# Patient Record
Sex: MALE | Race: WHITE | Employment: OTHER | URBAN - METROPOLITAN AREA
[De-identification: names, ages, dates, MRNs, and addresses within clinical notes are randomized per-mention and may not be internally consistent; named-entity substitution may affect disease eponyms.]

---

## 2019-08-14 ENCOUNTER — APPOINTMENT (OUTPATIENT)
Dept: GENERAL RADIOLOGY | Facility: CLINIC | Age: 74
End: 2019-08-14
Attending: FAMILY MEDICINE
Payer: MEDICARE

## 2019-08-14 ENCOUNTER — HOSPITAL ENCOUNTER (EMERGENCY)
Facility: CLINIC | Age: 74
Discharge: HOME | End: 2019-08-14
Attending: FAMILY MEDICINE | Admitting: FAMILY MEDICINE
Payer: MEDICARE

## 2019-08-14 ENCOUNTER — APPOINTMENT (OUTPATIENT)
Dept: CT IMAGING | Facility: CLINIC | Age: 74
End: 2019-08-14
Attending: FAMILY MEDICINE
Payer: MEDICARE

## 2019-08-14 VITALS
HEART RATE: 68 BPM | OXYGEN SATURATION: 97 % | RESPIRATION RATE: 20 BRPM | SYSTOLIC BLOOD PRESSURE: 170 MMHG | WEIGHT: 179 LBS | DIASTOLIC BLOOD PRESSURE: 115 MMHG | TEMPERATURE: 99.3 F

## 2019-08-14 DIAGNOSIS — S09.90XA CLOSED HEAD INJURY, INITIAL ENCOUNTER: ICD-10-CM

## 2019-08-14 DIAGNOSIS — S20.211A CHEST WALL CONTUSION, RIGHT, INITIAL ENCOUNTER: ICD-10-CM

## 2019-08-14 PROBLEM — N52.9 ED (ERECTILE DYSFUNCTION): Status: ACTIVE | Noted: 2019-08-14

## 2019-08-14 PROBLEM — I82.409 DVT (DEEP VENOUS THROMBOSIS) (H): Status: ACTIVE | Noted: 2019-08-14

## 2019-08-14 PROBLEM — E78.5 DYSLIPIDEMIA: Status: ACTIVE | Noted: 2019-08-14

## 2019-08-14 PROCEDURE — 99284 EMERGENCY DEPT VISIT MOD MDM: CPT | Mod: 25 | Performed by: FAMILY MEDICINE

## 2019-08-14 PROCEDURE — 99283 EMERGENCY DEPT VISIT LOW MDM: CPT | Mod: Z6 | Performed by: FAMILY MEDICINE

## 2019-08-14 PROCEDURE — 70450 CT HEAD/BRAIN W/O DYE: CPT

## 2019-08-14 PROCEDURE — 71046 X-RAY EXAM CHEST 2 VIEWS: CPT | Mod: TC

## 2019-08-14 NOTE — ED AVS SNAPSHOT
[facility] Emergency Department  [address] [place] [address]  Phone:  [phone]  Fax:  [phone]                                    Faheem Larsen   MRN: [id number]    Department:  [facility] Emergency Department   Date of Visit:  8/14/2019           After Visit Summary Signature Page    I have received my discharge instructions, and my questions have been answered. I have discussed any challenges I see with this plan with the nurse or doctor.    ..........................................................................................................................................  Patient/Patient Representative Signature      ..........................................................................................................................................  Patient Representative Print Name and Relationship to Patient    ..................................................               ................................................  Date                                   Time    ..........................................................................................................................................  Reviewed by Signature/Title    ...................................................              ..............................................  Date                                               Time          22EPIC Rev 08/18

## 2019-08-15 NOTE — ED NOTES
Patient interactive, jokes.  Daughter states patient has better / worse days.  States today he is more confused.

## 2019-08-15 NOTE — ED PROVIDER NOTES
[facility] ED Provider Note   Patient: Faheem Larsen  MRN #:  [id number]  Date of Visit: August 14, 2019    CC:     Chief Complaint   Patient presents with     Fall     HPI:  Faheem Larsen is a 74 year old male who presented to the emergency department after a fall. The patient was walking out of a store this afternoon at 1530 when he thought he was going to fall and began walking quickly and eventually lost his balance and fell. He fell on his right side and injured the right side of his head, right shoulder, right side of the ribcage, and right knee. The patient did not lose consciousness. The patient needed assistance standing up and had difficulty walking. The patient was tearful after falling. He was holding the right side of his face following the fall. The patient states that he is having a pinching pain on the right side of his chest while breathing. The patient denies any current headache, neck pain, abdominal pain or nausea. The patient has had episodes of vertigo in the past. The patient has occasional difficulty with walking. He will have difficulty lifting his legs and drags his feet. The patient has days of walking well and days of walking poorly. The patient's family states that the patient was particularly confused and unsteady today before the fall. The family thinks the additional confusion and unsteadiness may have been caused by the long periods of travel in the car the patient had today.  The patient is currently taking warfarin. His last INR was checked a couple days ago and was 2.6. The patient has a history of cardiac surgeries. The patient has had vertebral compression fractures.    Problem List:  Patient Active Problem List    Diagnosis Date Noted     DVT (deep venous thrombosis) (H) 08/14/2019     Priority: Medium     7/2011       Dyslipidemia 08/14/2019     Priority: Medium     ED (erectile dysfunction) 08/14/2019      Priority: Medium     Chatterjee's esophagus 04/26/2016     Priority: Medium     IMO Update 10/11       Depressive disorder 04/14/2016     Priority: Medium     Long standing  Long standing       Gastroenteritis 04/14/2016     Priority: Medium     Hyperlipidemia 04/14/2016     Priority: Medium     IMO Update 10/11  IMO Update 10/11       A-fib (H) 04/12/2016     Priority: Medium     Impaired fasting glucose 03/25/2013     Priority: Medium     Sleep apnea, organic 09/24/2012     Priority: Medium     Coronary arteriosclerosis in native artery 06/12/2011     Priority: Medium     CABG 2001, nonSTEMI 6/1/11 with rca stent  IMO Update 10/11  CABG 2001, nonSTEMI 6/1/11 with rca stent  IMO Update 10/11       Pulmonary embolism (H) 06/12/2011     Priority: Medium     DVT about 2008  Recurrent 6/2011       GERD (gastroesophageal reflux disease) 06/02/2011     Priority: Medium     HTN (hypertension) 06/02/2011     Priority: Medium     IMO Update 10/11       MI (myocardial infarction) (H) 06/02/2011     Priority: Medium     IMO Update 10/11  IMO Update 10/11    2001 and 1011         Past Medical History:   Diagnosis Date     Alzheimer's dementia        MEDS:   No current outpatient medications on file.    ALLERGIES:    Allergies   Allergen Reactions     Amoxicillin      Donepezil Hives     Dizziness, falling  Dizziness, falling         History reviewed. No pertinent surgical history.    Social History     Tobacco Use     Smoking status: Former Smoker     Smokeless tobacco: Never Used   Substance Use Topics     Alcohol use: Not Currently     Drug use: Never         Review of Systems   Except as noted in HPI, all other systems were reviewed and are negative    Physical Exam     Vitals were reviewed  Patient Vitals for the past 8 hrs:   BP Temp Temp src Pulse Resp SpO2 Weight   08/14/19 1948 (!) 170/115 99.3  F (37.4  C) Oral 68 20 97 % 81.2 kg (179 lb)     GENERAL APPEARANCE: Alert, no apparent distress.  GCS 15  HEAD:  Atraumatic  FACE: normal facies  EYES: Pupils are equal and reactive to light  HENT: normal external exam; TMs are intact and appear normal; no TMJ crepitus no evidence for malocclusion  NECK: no adenopathy or asymmetry: No midline posterior cervical spine tenderness  CHEST: Slight bruise in the right anterior chest over the pectoralis muscle; no significant tenderness or crepitus  RESP: normal respiratory effort; clear breath sounds bilaterally  CV: regular rate and rhythm; no significant murmurs, gallops or rubs  ABD: soft, no tenderness; no rebound or guarding; bowel sounds are normal  EXT: No calf tenderness or pitting edema  SKIN: no worrisome rash  NEURO: no facial droop; no focal deficits, speech is normal        Available Lab/Imaging Results     Results for orders placed or performed during the hospital encounter of 08/14/19 (from the past 24 hour(s))   Head CT w/o contrast    Narrative    CT SCAN OF THE HEAD WITHOUT CONTRAST   8/14/2019 9:01 PM     HISTORY: Closed head injury; on Coumadin.    TECHNIQUE: Axial images of the head and coronal reformations without  IV contrast material. Radiation dose for this scan was reduced using  automated exposure control, adjustment of the mA and/or kV according  to patient size, or iterative reconstruction technique.    COMPARISON: None.    FINDINGS:  There is generalized atrophy of the brain. There is low  attenuation in the white matter of the cerebral hemispheres consistent  with sequelae of small vessel ischemic disease. There is no evidence  of intracranial hemorrhage, mass, acute infarct or anomaly.     The visualized portions of the sinuses and mastoids appear normal.  There is no evidence of trauma.      Impression    IMPRESSION:   1. No acute abnormality.  2. Atrophy of the brain. White matter changes consistent with sequelae  of small vessel ischemic disease.      PAIGE HAMILTON MD   XR Chest 2 Views    Narrative    CHEST TWO VIEWS   8/14/2019 9:20 PM      HISTORY: Right chest wall injury.    COMPARISON: None.      Impression    IMPRESSION: No acute cardiopulmonary disease. No pneumothorax is seen.  Normal cardiac silhouette. Sternotomy.    HERBIE FRARELL MD              Impression     Final diagnoses:   Closed head injury, initial encounter   Chest wall contusion, right         ED Course & Medical Decision Making   Faheem Larsen is a 74 year old male who presented to the emergency department for evaluation of closed head and chest injury after he fell.  Patient had chest driven from [place] back to [place] with his daughter and son-in-law.  They are looking to move back to [place] from [place] to be closer to family.  Patient was at [employer] with his wife.  She had just put some items in the car, and was walking back into the store when the patient started to lose his balance.  He was lurching forward, and ended up falling on the concrete.  He may have hit his head slightly on the right side, and landed on the right side of his body.  He had his arm up against his chest and has a pain over the right anterior chest.  Since the fall, his ambulation is worse than normal, and he seems more confused.  Patient is on Coumadin, with most recent INR level checked at 2.6 just 2 days ago.  Patient was seen shortly after arrival.  He was alert, and interactive.  GCS was 15.  His head and neck exam was unremarkable.  I do not see any obvious injuries or bruising to the head.  He has a small bruise to the right anterior chest and it is minimally tender.  There is some other minor contusions to the arm but nothing that appears serious.  Given that he is on warfarin, patient underwent a CT scan of the head.  There is no acute abnormality.  There is atrophy of the brain with white matter changes consistent with sequela of small vessel ischemic disease.  X-ray of the chest reveals no pneumothorax or obvious rib fractures.  He has a sternotomy from previous open heart  surgery.  Patient and his family were reassured that there is does not appear to be any serious injuries from his fall.  Continue to monitor for any new or worsening symptoms and recheck as needed.  Patient is stable for discharge home.       Written after-visit summary and instructions were given at the time of discharge.       Disclaimer: This note consists of words and symbols derived from keyboarding and dictation using voice recognition software.  As a result, there may be errors that have gone undetected.  Please consider this when interpreting information found in this note.       Hemalatha Young MD  08/15/19 0023

## 2019-08-15 NOTE — DISCHARGE INSTRUCTIONS
The CT scan of the head did not reveal any bleeding or other injuries.  The chest x-ray was clear.  Apply ice to sore areas as tolerated for the next 2 days and then add heat as needed.  Take Tylenol as needed for pain.  Continue to monitor closely for any new or worsening symptoms.  Return to the emergency department if this occurs.

## 2019-08-15 NOTE — ED TRIAGE NOTES
Patient fell at [employer] about 1530 today. His daughter states when his confusion is worse, his unsteadiness is worse. History of alzheimers.